# Patient Record
Sex: MALE | Race: WHITE | NOT HISPANIC OR LATINO | Employment: FULL TIME | URBAN - METROPOLITAN AREA
[De-identification: names, ages, dates, MRNs, and addresses within clinical notes are randomized per-mention and may not be internally consistent; named-entity substitution may affect disease eponyms.]

---

## 2023-04-21 ENCOUNTER — OFFICE VISIT (OUTPATIENT)
Dept: PODIATRY | Facility: CLINIC | Age: 28
End: 2023-04-21

## 2023-04-21 VITALS
HEART RATE: 79 BPM | DIASTOLIC BLOOD PRESSURE: 74 MMHG | WEIGHT: 164 LBS | SYSTOLIC BLOOD PRESSURE: 113 MMHG | BODY MASS INDEX: 21.74 KG/M2 | HEIGHT: 73 IN

## 2023-04-21 DIAGNOSIS — M79.675 PAIN IN LEFT TOE(S): ICD-10-CM

## 2023-04-21 DIAGNOSIS — L60.0 INGROWN TOENAIL: Primary | ICD-10-CM

## 2023-04-21 DIAGNOSIS — M79.674 PAIN IN RIGHT TOE(S): ICD-10-CM

## 2023-04-21 RX ORDER — DEXTROAMPHETAMINE SACCHARATE, AMPHETAMINE ASPARTATE, DEXTROAMPHETAMINE SULFATE AND AMPHETAMINE SULFATE 2.5; 2.5; 2.5; 2.5 MG/1; MG/1; MG/1; MG/1
1 TABLET ORAL 2 TIMES DAILY
COMMUNITY
Start: 2023-02-28

## 2023-04-21 NOTE — LETTER
May 9, 2023     Paulette Delvalle, DO  [address] [facility] [address]    Patient: Rodrick Mosley   YOB: 1995   Date of Visit: 4/21/2023       Dear Dr Eric Maradiaga: Thank you for referring Rodrick Mosley to me for evaluation  Below are my notes for this consultation  If you have questions, please do not hesitate to call me  I look forward to following your patient along with you  Sincerely,        Lux Mendez DPM        CC: No Recipients  IKER Cardoza [facility]  5/9/2023 12:03 AM  Signed  Assessment/Plan:    Ingrown toenail  Pain in right toe(s)  Pain in left toe(s)  · Treatment options discussed consisting of partial nail avulsion versus total matrixectomy if issue becomes chronic enough  · Patient instructed on appropriate nail cutting technique  · Follow-up as needed  Other orders  -     amphetamine-dextroamphetamine (ADDERALL) 10 mg tablet; Take 1 tablet by mouth 2 (two) times a day       Subjective:     Patient ID: Rodrick Mosley is a 28 y o  male  4/21/2023: 54-year-old male concerned with possible ingrowing great toenail margins  Has noted some redness and tenderness over several months and has had difficulty cutting them out  The following portions of the patient's history were reviewed and updated as appropriate: allergies, current medications, past family history, past medical history, past social history, past surgical history and problem list     Review of Systems   Constitutional: Negative for chills and fever  HENT: Negative for ear pain and sore throat  Eyes: Negative for pain and visual disturbance  Respiratory: Negative for cough and shortness of breath  Cardiovascular: Negative for chest pain and palpitations  Gastrointestinal: Negative for abdominal pain and vomiting  Genitourinary: Negative for dysuria and hematuria  Musculoskeletal: Negative for arthralgias and back pain  Skin: Negative for color change and rash          Bilateral great "toenail pain from nail margin   Neurological: Negative for seizures and syncope  All other systems reviewed and are negative  Objective:      /74   Pulse 79   Ht 6' 1\" (1 854 m) Comment: verbal  Wt 74 4 kg (164 lb)   BMI 21 64 kg/m²         Physical Exam  Constitutional:       Appearance: Normal appearance  HENT:      Head: Normocephalic  Right Ear: Tympanic membrane normal       Left Ear: Tympanic membrane normal       Nose: No congestion  Mouth/Throat:      Mouth: Mucous membranes are moist       Pharynx: No oropharyngeal exudate or posterior oropharyngeal erythema  Eyes:      Extraocular Movements: Extraocular movements intact  Conjunctiva/sclera: Conjunctivae normal       Pupils: Pupils are equal, round, and reactive to light  Cardiovascular:      Rate and Rhythm: Normal rate and regular rhythm  Pulses: Normal pulses  Pulmonary:      Effort: Pulmonary effort is normal    Abdominal:      Palpations: Abdomen is soft  Musculoskeletal:         General: Normal range of motion  Feet:      Right foot:      Skin integrity: Skin integrity normal       Toenail Condition: Right toenails are ingrown  Left foot:      Skin integrity: Skin integrity normal       Toenail Condition: Left toenails are ingrown  Skin:     General: Skin is warm and dry  Capillary Refill: Capillary refill takes 2 to 3 seconds  Coloration: Skin is not pale  Findings: No bruising, erythema, lesion or rash  Comments: Incurvated left lateral and right medial hallux nail margins, other nail margin somewhat incurvated  No infection or drainage noted  Localized induration noted around periungual tissue  Neurological:      General: No focal deficit present  Mental Status: He is alert  Cranial Nerves: No cranial nerve deficit  Sensory: No sensory deficit  Motor: No weakness        Gait: Gait normal       Deep Tendon Reflexes: Reflexes normal    Psychiatric:    " Mood and Affect: Mood normal          Behavior: Behavior normal          Judgment: Judgment normal

## 2023-05-09 NOTE — PROGRESS NOTES
"Assessment/Plan:    Ingrown toenail  Pain in right toe(s)  Pain in left toe(s)  Treatment options discussed consisting of partial nail avulsion versus total matrixectomy if issue becomes chronic enough  Patient instructed on appropriate nail cutting technique  Follow-up as needed  Other orders  -     amphetamine-dextroamphetamine (ADDERALL) 10 mg tablet; Take 1 tablet by mouth 2 (two) times a day        Subjective:      Patient ID: Bebo Ndiaye is a 28 y o  male  4/21/2023: 25-year-old male concerned with possible ingrowing great toenail margins  Has noted some redness and tenderness over several months and has had difficulty cutting them out  The following portions of the patient's history were reviewed and updated as appropriate: allergies, current medications, past family history, past medical history, past social history, past surgical history and problem list     Review of Systems   Constitutional: Negative for chills and fever  HENT: Negative for ear pain and sore throat  Eyes: Negative for pain and visual disturbance  Respiratory: Negative for cough and shortness of breath  Cardiovascular: Negative for chest pain and palpitations  Gastrointestinal: Negative for abdominal pain and vomiting  Genitourinary: Negative for dysuria and hematuria  Musculoskeletal: Negative for arthralgias and back pain  Skin: Negative for color change and rash  Bilateral great toenail pain from nail margin   Neurological: Negative for seizures and syncope  All other systems reviewed and are negative  Objective:      /74   Pulse 79   Ht 6' 1\" (1 854 m) Comment: verbal  Wt 74 4 kg (164 lb)   BMI 21 64 kg/m²          Physical Exam  Constitutional:       Appearance: Normal appearance  HENT:      Head: Normocephalic  Right Ear: Tympanic membrane normal       Left Ear: Tympanic membrane normal       Nose: No congestion        Mouth/Throat:      Mouth: Mucous membranes are " moist       Pharynx: No oropharyngeal exudate or posterior oropharyngeal erythema  Eyes:      Extraocular Movements: Extraocular movements intact  Conjunctiva/sclera: Conjunctivae normal       Pupils: Pupils are equal, round, and reactive to light  Cardiovascular:      Rate and Rhythm: Normal rate and regular rhythm  Pulses: Normal pulses  Pulmonary:      Effort: Pulmonary effort is normal    Abdominal:      Palpations: Abdomen is soft  Musculoskeletal:         General: Normal range of motion  Feet:      Right foot:      Skin integrity: Skin integrity normal       Toenail Condition: Right toenails are ingrown  Left foot:      Skin integrity: Skin integrity normal       Toenail Condition: Left toenails are ingrown  Skin:     General: Skin is warm and dry  Capillary Refill: Capillary refill takes 2 to 3 seconds  Coloration: Skin is not pale  Findings: No bruising, erythema, lesion or rash  Comments: Incurvated left lateral and right medial hallux nail margins, other nail margin somewhat incurvated  No infection or drainage noted  Localized induration noted around periungual tissue  Neurological:      General: No focal deficit present  Mental Status: He is alert  Cranial Nerves: No cranial nerve deficit  Sensory: No sensory deficit  Motor: No weakness        Gait: Gait normal       Deep Tendon Reflexes: Reflexes normal    Psychiatric:         Mood and Affect: Mood normal          Behavior: Behavior normal          Judgment: Judgment normal

## 2023-06-06 ENCOUNTER — APPOINTMENT (OUTPATIENT)
Dept: RADIOLOGY | Facility: CLINIC | Age: 28
End: 2023-06-06
Payer: COMMERCIAL

## 2023-06-06 ENCOUNTER — OFFICE VISIT (OUTPATIENT)
Dept: OBGYN CLINIC | Facility: CLINIC | Age: 28
End: 2023-06-06
Payer: COMMERCIAL

## 2023-06-06 VITALS
BODY MASS INDEX: 22 KG/M2 | DIASTOLIC BLOOD PRESSURE: 77 MMHG | SYSTOLIC BLOOD PRESSURE: 148 MMHG | HEIGHT: 73 IN | WEIGHT: 166 LBS | HEART RATE: 73 BPM

## 2023-06-06 DIAGNOSIS — M25.562 LEFT KNEE PAIN, UNSPECIFIED CHRONICITY: ICD-10-CM

## 2023-06-06 DIAGNOSIS — M25.562 LEFT KNEE PAIN, UNSPECIFIED CHRONICITY: Primary | ICD-10-CM

## 2023-06-06 PROCEDURE — 99204 OFFICE O/P NEW MOD 45 MIN: CPT | Performed by: STUDENT IN AN ORGANIZED HEALTH CARE EDUCATION/TRAINING PROGRAM

## 2023-06-06 PROCEDURE — 73564 X-RAY EXAM KNEE 4 OR MORE: CPT

## 2023-06-06 RX ORDER — MECLIZINE HYDROCHLORIDE 25 MG/1
25 TABLET ORAL 3 TIMES DAILY
COMMUNITY
Start: 2023-03-02

## 2023-06-06 RX ORDER — METHYLPREDNISOLONE 4 MG/1
TABLET ORAL
COMMUNITY
Start: 2023-03-08

## 2023-06-06 RX ORDER — NAPROXEN 500 MG/1
TABLET ORAL
COMMUNITY
Start: 2023-03-08

## 2023-06-06 RX ORDER — ONDANSETRON 4 MG/1
TABLET, ORALLY DISINTEGRATING ORAL
COMMUNITY
Start: 2023-04-05

## 2023-06-06 NOTE — PROGRESS NOTES
Ortho Sports Medicine Knee New Patient Visit     Assesment:   28 y o  male left knee pain concerning for medial meniscus tear versus symptomatic medial plica    Plan:  The patient's diagnosis and treatment were discussed at length today  We discussed no treatment, non-operative treatment, and operative treatment  The patient's finding and exam are consistent with left knee pain concerning for medial meniscus tear versus symptomatic medial plica  Given he has attempted 6-8+ weeks of nonoperative treatment with continued symptoms as well as mechanical symptoms, I did recommend obtaining an MRI to rule out any further internal derangement  MRI of left knee was ordered  I did recommend that he continues with his home exercise program and continues with his current activity restrictions  He can continue to use ice, heat, and over-the-counter medication as needed for pain relief  He is to follow-up in approximately 1 to 2 weeks for reevaluation and discussion of MRI results  Conservative treatment:    Ice to knee for 20 minutes at least 1-2 times daily  PT for ROM/strengthening to knee, hip and core  OTC NSAIDS prn for pain  Let pain guide gradual return activities  Imaging: All imaging from today was reviewed by myself and explained to the patient  We will obtain an MRI of the knee to rule out internal derangement  Follow up in 1-2 weeks for MRI review  Will make a definitive treatment plan based on the results of the MRI  Injection:    No Injection planned at this time  Surgery:     No surgery is recommended at this point, continue with conservative treatment plan as noted  Follow up:    Return for results following MRI  Chief Complaint   Patient presents with   • Left Knee - Pain       History of Present Illness:     The patient is a 28 y o  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, referred to me by their primary care physician, seen in clinic for evaluation of left knee pain   He states he has been having ongoing left knee pain for several months now after he was standing at work and felt a pop in his leg followed by significant pain and swelling  He states that his pain is predominantly along the anterior medial aspect of his knee and rates it a 5 out of 10 at rest and increases to a 7 out of 10 with activity  He states that he has been in outpatient physical therapy for 6-8+ weeks now without any significant relief of his symptoms  He denies any instability, however he does report mechanical catching and locking sensation where he feels like his leg is stuck and will not fully extend  He states that he has tried a knee brace that does not provide him any comfort and stability  He is currently attempting to manage his pain with rest, ice, and over-the-counter medication  Knee Surgical History:  None    Past Medical, Social and Family History:  History reviewed  No pertinent past medical history  History reviewed  No pertinent surgical history  No Known Allergies  Current Outpatient Medications on File Prior to Visit   Medication Sig Dispense Refill   • amphetamine-dextroamphetamine (ADDERALL) 10 mg tablet Take 1 tablet by mouth 2 (two) times a day     • meclizine (ANTIVERT) 25 mg tablet Take 25 mg by mouth 3 (three) times a day     • methylPREDNISolone 4 MG tablet therapy pack TAKE 6 TABLETS ON DAY 1 AS DIRECTED ON PACKAGE AND DECREASE BY 1 TAB EACH DAY FOR A TOTAL OF 6 DAYS     • naproxen (NAPROSYN) 500 mg tablet TAKE 1 TABLET BY MOUTH TWICE A DAY AS NEEDED FOR PAIN FOR 5 DAYS     • ondansetron (ZOFRAN-ODT) 4 mg disintegrating tablet        No current facility-administered medications on file prior to visit       Social History     Socioeconomic History   • Marital status: Single     Spouse name: Not on file   • Number of children: Not on file   • Years of education: Not on file   • Highest education level: Not on file   Occupational History   • Not on file   Tobacco Use "  • Smoking status: Never     Passive exposure: Never   • Smokeless tobacco: Never   Substance and Sexual Activity   • Alcohol use: Yes   • Drug use: Yes     Types: Marijuana   • Sexual activity: Not on file   Other Topics Concern   • Not on file   Social History Narrative   • Not on file     Social Determinants of Health     Financial Resource Strain: Not on file   Food Insecurity: Not on file   Transportation Needs: Not on file   Physical Activity: Not on file   Stress: Not on file   Social Connections: Not on file   Intimate Partner Violence: Not on file   Housing Stability: Not on file         I have reviewed the past medical, surgical, social and family history, medications and allergies as documented in the EMR  Review of systems: ROS is negative other than that noted in the HPI  Constitutional: Negative for fatigue and fever  HENT: Negative for sore throat  Respiratory: Negative for shortness of breath  Cardiovascular: Negative for chest pain  Gastrointestinal: Negative for abdominal pain  Endocrine: Negative for cold intolerance and heat intolerance  Genitourinary: Negative for flank pain  Musculoskeletal: Negative for back pain  Skin: Negative for rash  Allergic/Immunologic: Negative for immunocompromised state  Neurological: Negative for dizziness  Psychiatric/Behavioral: Negative for agitation  Physical Exam:    Blood pressure 148/77, pulse 73, height 6' 1\" (1 854 m), weight 75 3 kg (166 lb)      General/Constitutional: NAD, well developed, well nourished  HENT: Normocephalic, atraumatic  CV: Intact distal pulses, regular rate  Resp: No respiratory distress or labored breathing  Lymphatic: No lymphadenopathy palpated  Neuro: Alert and Oriented x 3, no focal deficits  Psych: Normal mood, normal affect, normal judgement, normal behavior  Skin: Warm, dry, no rashes, no erythema      Knee Exam (focused):  Visual inspection of the Left knee demonstrates normal contour without " atrophy  No previous incisions   There is no significant erythema or edema  No significant joint effusion   Range of motion is full from 0-130 degrees of flexion   Able to straight leg raise   No patella tender to palpation  Mild medial joint line tenderness, No lateral joint line tenderness  + medial Cari's, - lateral Cari's  1A Lachman exam, Stable posterior drawer  - dial test  Stable to varus and valgus stress at both 0 and 30°  Patella tracks normally  No J sign  No apprehension  Translation is approximately 2 quadrants and is equal to the contralateral side  Patellar eversion is similar to the contralateral side    Examination of the patient's ipsilateral hip demonstrates full painless range of motion  No crepitus  LE NV Exam: +2 DP/PT pulses bilaterally  Sensation intact to light touch L2-S1 bilaterally     Bilateral hip ROM demonstrates no pain actively or passively    No calf tenderness to palpation bilaterally    Knee Imaging    X-rays of the left knee were reviewed, which demonstrate no acute osseous abnormalities or evidence of dislocation  I have reviewed the radiology report and do not currently have a radiology reading from  [facility], but will check the result once the reading is performed        Scribe Attestation    I,:  Keya Fairchild am acting as a scribe while in the presence of the attending physician :       I,:  Jony Esparza, DO personally performed the services described in this documentation    as scribed in my presence :

## 2023-06-22 ENCOUNTER — HOSPITAL ENCOUNTER (OUTPATIENT)
Dept: MRI IMAGING | Facility: HOSPITAL | Age: 28
End: 2023-06-22
Attending: STUDENT IN AN ORGANIZED HEALTH CARE EDUCATION/TRAINING PROGRAM
Payer: COMMERCIAL

## 2023-06-22 DIAGNOSIS — M25.562 LEFT KNEE PAIN, UNSPECIFIED CHRONICITY: ICD-10-CM

## 2023-06-22 PROCEDURE — G1004 CDSM NDSC: HCPCS

## 2023-06-22 PROCEDURE — 73721 MRI JNT OF LWR EXTRE W/O DYE: CPT

## 2023-06-29 ENCOUNTER — OFFICE VISIT (OUTPATIENT)
Dept: OBGYN CLINIC | Facility: CLINIC | Age: 28
End: 2023-06-29
Payer: COMMERCIAL

## 2023-06-29 VITALS
HEIGHT: 73 IN | BODY MASS INDEX: 22 KG/M2 | WEIGHT: 166 LBS | SYSTOLIC BLOOD PRESSURE: 132 MMHG | HEART RATE: 74 BPM | DIASTOLIC BLOOD PRESSURE: 83 MMHG | RESPIRATION RATE: 18 BRPM

## 2023-06-29 DIAGNOSIS — M22.2X1 RIGHT PATELLOFEMORAL SYNDROME: Primary | ICD-10-CM

## 2023-06-29 DIAGNOSIS — M22.2X2 PATELLOFEMORAL SYNDROME, LEFT: ICD-10-CM

## 2023-06-29 DIAGNOSIS — M76.31 IT BAND SYNDROME, RIGHT: ICD-10-CM

## 2023-06-29 PROCEDURE — 99214 OFFICE O/P EST MOD 30 MIN: CPT | Performed by: STUDENT IN AN ORGANIZED HEALTH CARE EDUCATION/TRAINING PROGRAM

## 2023-06-29 NOTE — PROGRESS NOTES
Ortho Sports Medicine Knee Follow Up Visit     Assesment:     28 y o  male bilateral knee patellofemoral syndrome    Plan:  The patient's diagnosis and treatment were discussed at length today  We discussed no treatment, non-operative treatment, and operative treatment  Patient's finding and exam are consistent with bilateral knee patellofemoral pain syndrome  I discussed with him that his left knee MRI demonstrates no evidence of internal derangement with no injury to the cruciate ligaments or medial/lateral meniscus  MRI did demonstrate very minimal medial plica that may be symptomatic, however this can be managed with activity modification and physical therapy  I did provide him with a referral to outpatient physical therapy for bilateral patellofemoral pain syndrome as he is also demonstrating similar symptoms on his right side  I discussed with him that he is able to continue perform activity as tolerated, using pain as a guide  He continue to use his brace for either left or right knee for comfort and stability  He is to follow-up on an as-needed basis  Conservative treatment:    Ice to knee for 20 minutes at least 1-2 times daily  PT for ROM/strengthening to knee, hip and core  OTC NSAIDS prn for pain  Let pain guide gradual return activities  Imaging: All imaging from today was reviewed by myself and explained to the patient  Injection:    No Injection planned at this time  Surgery:     No surgery is recommended at this point, continue with conservative treatment plan as noted  Follow up:    No follow-ups on file  Chief Complaint   Patient presents with   • Left Knee - Follow-up     MRI Review       History of Present Illness: The patient is returns for follow up of left knee pain  Since the prior visit, He he states that he is overall doing well at this time and only reports mild pain and discomfort along the anterior aspect of his left knee    He states that his pain is very dull and achy and rates it a 2 out of 10 after periods of prolonged standing and walking  He states that he has been compliant with outpatient physical therapy as well as use of his brace which does increase the comfort and stability  However, he does mention that recently he has been having similar symptoms on his right knee  He denies any mechanical symptoms of catching or locking  He denies any instability  He denies any swelling  He denies any injury or trauma  He overall does feel like he is progressing well with physical therapy and is able to return to activities of daily living without any restrictions or limitations  Knee Surgical History:  None    Past Medical, Social and Family History:  History reviewed  No pertinent past medical history  History reviewed  No pertinent surgical history  No Known Allergies  Current Outpatient Medications on File Prior to Visit   Medication Sig Dispense Refill   • amphetamine-dextroamphetamine (ADDERALL) 10 mg tablet Take 1 tablet by mouth 2 (two) times a day     • meclizine (ANTIVERT) 25 mg tablet Take 25 mg by mouth 3 (three) times a day     • methylPREDNISolone 4 MG tablet therapy pack TAKE 6 TABLETS ON DAY 1 AS DIRECTED ON PACKAGE AND DECREASE BY 1 TAB EACH DAY FOR A TOTAL OF 6 DAYS     • naproxen (NAPROSYN) 500 mg tablet TAKE 1 TABLET BY MOUTH TWICE A DAY AS NEEDED FOR PAIN FOR 5 DAYS     • ondansetron (ZOFRAN-ODT) 4 mg disintegrating tablet        No current facility-administered medications on file prior to visit  Social History     Socioeconomic History   • Marital status: Single     Spouse name: Not on file   • Number of children: Not on file   • Years of education: Not on file   • Highest education level: Not on file   Occupational History   • Not on file   Tobacco Use   • Smoking status: Never     Passive exposure: Never   • Smokeless tobacco: Never   Substance and Sexual Activity   • Alcohol use:  Yes   • Drug use: Yes     Types: "Marijuana   • Sexual activity: Not on file   Other Topics Concern   • Not on file   Social History Narrative   • Not on file     Social Determinants of Health     Financial Resource Strain: Not on file   Food Insecurity: Not on file   Transportation Needs: Not on file   Physical Activity: Not on file   Stress: Not on file   Social Connections: Not on file   Intimate Partner Violence: Not on file   Housing Stability: Not on file         I have reviewed the past medical, surgical, social and family history, medications and allergies as documented in the EMR  Review of systems: ROS is negative other than that noted in the HPI  Constitutional: Negative for fatigue and fever  Physical Exam:    Blood pressure 132/83, pulse 74, resp  rate 18, height 6' 1\" (1 854 m), weight 75 3 kg (166 lb)  General/Constitutional: NAD, well developed, well nourished  HENT: Normocephalic, atraumatic  CV: Intact distal pulses, regular rate  Resp: No respiratory distress or labored breathing  Lymphatic: No lymphadenopathy palpated  Neuro: Alert and Oriented x 3, no focal deficits  Psych: Normal mood, normal affect, normal judgement, normal behavior  Skin: Warm, dry, no rashes, no erythema      Knee Exam (focused):  Visual inspection of bilateral knees demonstrates normal contour without atrophy  No previous incisions   There is no significant erythema or edema  No significant joint effusion   Range of motion is full from 0-130 degrees of flexion   Able to straight leg raise   Mild patella tendon and quadriceps tendon tender to palpation without crepitus  No medial joint line tenderness, no lateral joint line tenderness  - medial Cari's, - lateral Cari's  1A Lachman exam, stable posterior drawer  - dial test  Stable to varus and valgus stress at both 0 and 30°  Patella tracks normally  No J sign  No apprehension    Translation is approximately 2 quadrants and is equal to the contralateral side  Patellar eversion is " similar to the contralateral side    Examination of the patient's ipsilateral hip demonstrates full painless range of motion  No crepitus  LE NV Exam: +2 DP/PT pulses bilaterally  Sensation intact to light touch L2-S1 bilaterally    No calf tenderness to palpation bilaterally      Knee Imaging    MRI of left knee demonstrates no evidence of internal derangement  There is a tiny medial plica without associated chondral abnormalities noted    I have reviewed and agree that with the radiologist interpretation      Scribe Attestation    I,:  Pat Aranda am acting as a scribe while in the presence of the attending physician :       I,:  Latoya Pryor DO personally performed the services described in this documentation    as scribed in my presence :

## 2023-07-25 ENCOUNTER — EVALUATION (OUTPATIENT)
Dept: PHYSICAL THERAPY | Facility: CLINIC | Age: 28
End: 2023-07-25
Payer: COMMERCIAL

## 2023-07-25 DIAGNOSIS — M76.31 IT BAND SYNDROME, RIGHT: ICD-10-CM

## 2023-07-25 DIAGNOSIS — M22.2X2 PATELLOFEMORAL SYNDROME, LEFT: ICD-10-CM

## 2023-07-25 DIAGNOSIS — M22.2X1 RIGHT PATELLOFEMORAL SYNDROME: Primary | ICD-10-CM

## 2023-07-25 PROCEDURE — 97161 PT EVAL LOW COMPLEX 20 MIN: CPT | Performed by: PHYSICAL THERAPIST

## 2023-07-25 PROCEDURE — 97110 THERAPEUTIC EXERCISES: CPT | Performed by: PHYSICAL THERAPIST

## 2023-07-25 NOTE — PROGRESS NOTES
PT Evaluation     Today's date: 2023  Patient name: Lien Cha  : 1995  MRN: [id number]  Referring provider: Shanice Guerrero DO  Dx:   Encounter Diagnosis     ICD-10-CM    1. Right patellofemoral syndrome  M22.2X1 Ambulatory Referral to Physical Therapy      2. Patellofemoral syndrome, left  M22.2X2 Ambulatory Referral to Physical Therapy      3. It band syndrome, right  M76.31 Ambulatory Referral to Physical Therapy                     Assessment  Assessment details: Lien Cha is a 28 y.o. male presenting to outpatient physical therapy at [facility] with complaints of bilateral knee pain.  They present with decreased range of motion, decreased strength, limited flexibility, poor postural awareness, poor body mechanics, poor balance, decreased tolerance to activity and decreased functional mobility due to Right patellofemoral syndrome  (primary encounter diagnosis)  Patellofemoral syndrome, left  It band syndrome, right. Katy Murphy would benefit from skilled physical therapy to address noted impairments in order to allow for full functional return to work and ADL-related activities. Thank you for the referral!  Impairments: abnormal muscle firing, abnormal or restricted ROM, activity intolerance, impaired balance, impaired physical strength, lacks appropriate home exercise program, pain with function and poor body mechanics  Barriers to therapy: n/a  Understanding of Dx/Px/POC: excellent  Goals  ST.  Independent with HEP in 2 weeks  2. Decrease pain by 50% in 3 wks      LT. Achieve FOTO score of 74/100 in 6 weeks   2.  Able to perform stairs without pain/instability in 6 weeks  3. Strength = 5/5 L quad ext in 6 weeks      Plan  Plan details: RE in 6 weeks.   Patient would benefit from: skilled physical therapy  Planned modality interventions: cryotherapy, electrical stimulation/Russian stimulation and thermotherapy: hydrocollator packs  Planned therapy interventions: abdominal trunk stabilization, manual therapy, neuromuscular re-education, therapeutic activities, therapeutic exercise, body mechanics training and home exercise program  Frequency: 2x week  Duration in weeks: 6  Plan of Care beginning date: 7/25/2023  Plan of Care expiration date: 9/8/2023  Treatment plan discussed with: patient        Subjective Evaluation    History of Present Illness  Mechanism of injury: Pt c/o bilateral knee pain. L knee pain Onset October 2022. Pt was standing, talking to his coworkers for some time. When he went to walk away, he felt a pop in the knee with swelling to follow. Denies bruising. He did go to PT for 8 weeks a few months ago, which consisted of strengthening the quads. He reports no significant change in symptoms with this. He did develop R knee pain as a result of compensations. Imaging negative. Pain is located anterior medial L knee. Described as intermittent dull aching. Rated 0-4/10 pain. Agg with pushing his push mower up an incline, being in a crouched/squatted position for prolonged periods of time. Eased with ibuprofen. Positive for knee giving out, clicking. Positive for unsteadiness with gait and stairs. 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   Patient Goals  Patient goals for therapy: decreased edema, decreased pain, improved balance, increased motion, return to work, return to [employer] activities, independence with ADLs/IADLs and increased strength          Objective     Observations     Additional Observation Details  No edema noted    Tenderness     Additional Tenderness Details  TTP medial and anterior L knee joint line; patellar tendon    (-) ant, post drawer  (-) varus, valgus stress  (-) Medial, lat mcmurrays    SLR to 90+ deg with mild ext lag secondary to HS restriction    Active Range of Motion   Left Hip   Internal rotation (90/90): 5 degrees     Right Hip   Internal rotation (90/90): 0 degrees   Left Knee Normal active range of motion    Right Knee   Normal active range of motion    Strength/Myotome Testing     Left Knee   Flexion: 4+  Extension: 4+    Right Knee   Flexion: 5  Extension: 5    Functional Assessment        Comments  Gait: WFL  SLS: fair, 10 sec bilat  SL HR: fair bilat  Squat: poor form - knee dominant with excessive tibial anterior translation with heel lift bilaterally, pain             Precautions: bilat patellofemoral synd      HEP:  Access Code: [email or web address]  URL: [email or web address]  Date: 07/25/2023  Prepared by: Rafael Ruelas    Exercises  - Supine Piriformis Stretch with Leg Straight  - 2 sets - 30 sec hold  - Supine Hamstring Stretch  - 30 reps  - Prone Quad Stretch with Towel Roll and Strap  - 2 sets - 30 sec hold      Manuals 7/25            Quad/hip flexor stretching             Hip IR stretching             Quad roller NYA            Posterior chain roller NYA                                      Neuro Re-Ed             SLS             Foam walk             bosu taps             bosu balance                                                    Ther Ex             TM fwd walk             TM bwd walk             Supine piri stretch 30"x2 ea            90/90 active HS stretch x30 ea            Prone quad stretch w strap 30"x2            Hip flexor stretch             Sled push/pull             CC walk outs lat             CC walk outs fwd             CC walk outs bwd             TKE                                                                 Ther Activity                                       Gait Training                                       Modalities

## 2023-07-31 ENCOUNTER — APPOINTMENT (OUTPATIENT)
Dept: PHYSICAL THERAPY | Facility: CLINIC | Age: 28
End: 2023-07-31
Payer: COMMERCIAL

## 2023-08-07 ENCOUNTER — OFFICE VISIT (OUTPATIENT)
Dept: PHYSICAL THERAPY | Facility: CLINIC | Age: 28
End: 2023-08-07
Payer: COMMERCIAL

## 2023-08-07 DIAGNOSIS — M22.2X1 RIGHT PATELLOFEMORAL SYNDROME: Primary | ICD-10-CM

## 2023-08-07 DIAGNOSIS — M22.2X2 PATELLOFEMORAL SYNDROME, LEFT: ICD-10-CM

## 2023-08-07 PROCEDURE — 97110 THERAPEUTIC EXERCISES: CPT

## 2023-08-07 PROCEDURE — 97140 MANUAL THERAPY 1/> REGIONS: CPT

## 2023-08-07 PROCEDURE — 97112 NEUROMUSCULAR REEDUCATION: CPT

## 2023-08-07 NOTE — PROGRESS NOTES
Daily Note     Today's date: 2023  Patient name: Jose Alexandra  : 1995  MRN: [id number]  Referring provider: Melvin Chicas DO  Dx:   Encounter Diagnosis     ICD-10-CM    1. Right patellofemoral syndrome  M22.2X1       2. Patellofemoral syndrome, left  M22.2X2                      Subjective: Pt reports he feels a little less tightness since starting his HEP. Objective: See treatment diary below      Assessment: Initiated PT POC. Pt has good ROM but benefits from the soft tissue work to LLE muscles. Has only fair balance on uneven surfaces Tolerated treatment well. Patient demonstrated fatigue post treatment, exhibited good technique with therapeutic exercises and would benefit from continued PT      Plan: Continue per plan of care. Progress note during next visit. Progress treatment as tolerated. Precautions: bilat patellofemoral synd      HEP:  Access Code: [email or web address]  URL: [email or web address]  Date: 2023  Prepared by: Josue Gamez    Exercises  - Supine Piriformis Stretch with Leg Straight  - 2 sets - 30 sec hold  - Supine Hamstring Stretch  - 30 reps  - Prone Quad Stretch with Towel Roll and Strap  - 2 sets - 30 sec hold      Manuals  8/           Quad/hip flexor stretching  WE           Hip IR stretching  WE           Quad roller NYA WE           Posterior chain roller NYA WE                                     Neuro Re-Ed             SLS  Foam  20"x3           Foam walk  Beam  3 laps           bosu taps  x20 ea           bosu balance  30"x3                                                  Ther Ex             TM fwd walk  3 min  Incline  15%           TM bwd walk  Self propel 3 min           Supine piri stretch 30"x2 ea            90/90 active HS stretch x30 ea manual           Prone quad stretch w strap 30"x2 manual           Hip flexor stretch  manual           Sled push/pull             CC walk outs lat  55#  x5 ea           CC walk outs fwd  55#  x5 CC walk outs bwd  55#  x5           TKE  BTB  x30           Leg Press  205#  x20           SL Leg Press  105#  x10                                     Ther Activity                                       Gait Training                                       Modalities

## 2023-08-14 ENCOUNTER — OFFICE VISIT (OUTPATIENT)
Dept: PHYSICAL THERAPY | Facility: CLINIC | Age: 28
End: 2023-08-14
Payer: COMMERCIAL

## 2023-08-14 DIAGNOSIS — M22.2X1 RIGHT PATELLOFEMORAL SYNDROME: Primary | ICD-10-CM

## 2023-08-14 DIAGNOSIS — M22.2X2 PATELLOFEMORAL SYNDROME, LEFT: ICD-10-CM

## 2023-08-14 PROCEDURE — 97140 MANUAL THERAPY 1/> REGIONS: CPT

## 2023-08-14 PROCEDURE — 97112 NEUROMUSCULAR REEDUCATION: CPT

## 2023-08-14 PROCEDURE — 97110 THERAPEUTIC EXERCISES: CPT

## 2023-08-14 NOTE — PROGRESS NOTES
Daily Note     Today's date: 2023  Patient name: Ema Martins  : 1995  MRN: [id number]  Referring provider: Taylor Gaming DO  Dx:   Encounter Diagnosis     ICD-10-CM    1. Right patellofemoral syndrome  M22.2X1       2. Patellofemoral syndrome, left  M22.2X2                      Subjective: Pt reports he felt increased tightness after LV. Overall feeling a little better since starting therapy     Objective: See treatment diary below      Assessment: Trialed roller to ITB followed b/l LE stretching and IASTM to b/l patellar tendons. He felt decreased tightness post. Showed improved stability with progressed balance activities and continued to challenge strengthening TE's with no adverse effects. Tolerated treatment well. Patient demonstrated fatigue post treatment, exhibited good technique with therapeutic exercises and would benefit from continued PT      Plan: Continue per plan of care. Progress note during next visit. Progress treatment as tolerated. Precautions: bilat patellofemoral synd      HEP:  Access Code: [email or web address]  URL: [email or web address]  Date: 2023  Prepared by: Valiant Neas    Exercises  - Supine Piriformis Stretch with Leg Straight  - 2 sets - 30 sec hold  - Supine Hamstring Stretch  - 30 reps  - Prone Quad Stretch with Towel Roll and Strap  - 2 sets - 30 sec hold      Manuals           Quad/hip flexor stretching  WE WE          Hip IR stretching  WE WE          Quad roller NYA WE WE          Posterior chain roller NYA WE WE          IASTM b/l patellar tendons                          Neuro Re-Ed             SLS  Foam  20"x3 Bosu  Round up  20"x3 ea          Foam walk  Beam  3 laps           bosu taps  x20 ea x20 ea          bosu balance  30"x3 30"x3          SL star balance   x10 ea          Bosu squats   5"x10                       Ther Ex             TM fwd walk  3 min  Incline  15% 3 min  Incline  15%          TM bwd walk  Self propel 3 min Self propel 3 min          Supine piri stretch 30"x2 ea            90/90 active HS stretch x30 ea manual           Prone quad stretch w strap 30"x2 manual           Hip flexor stretch  manual           Sled push/pull             CC walk outs lat  55#  x5 ea           CC walk outs fwd  55#  x5           CC walk outs bwd  55#  x5           TKE  BTB  x30 CC  x20          Leg Press  205#  x20           SL Leg Press  105#  x10 115#  x20 ea                                    Ther Activity                                       Gait Training                                       Modalities

## 2023-08-21 ENCOUNTER — OFFICE VISIT (OUTPATIENT)
Dept: PHYSICAL THERAPY | Facility: CLINIC | Age: 28
End: 2023-08-21
Payer: COMMERCIAL

## 2023-08-21 DIAGNOSIS — M22.2X2 PATELLOFEMORAL SYNDROME, LEFT: ICD-10-CM

## 2023-08-21 DIAGNOSIS — M22.2X1 RIGHT PATELLOFEMORAL SYNDROME: Primary | ICD-10-CM

## 2023-08-21 DIAGNOSIS — M76.31 IT BAND SYNDROME, RIGHT: ICD-10-CM

## 2023-08-21 PROCEDURE — 97110 THERAPEUTIC EXERCISES: CPT | Performed by: PHYSICAL THERAPIST

## 2023-08-21 NOTE — PROGRESS NOTES
Daily Note     Today's date: 2023  Patient name: Candida Muñoz  : 1995  MRN: [id number]  Referring provider: Rona Lucero DO  Dx:   Encounter Diagnosis     ICD-10-CM    1. Right patellofemoral syndrome  M22.2X1       2. Patellofemoral syndrome, left  M22.2X2       3. It band syndrome, right  M76.31           Start Time: 1530          Subjective: Feeling better in the knees. Did do a workout with leg press the other week and noticed increased knee pain after. Having quad tightness today. Objective: See treatment diary below      Assessment: Discussed with pt the importance of daily mobility work for his quads and hip flexors, especially on days when he is doing heavy leg work. Favorable to couch stretch for anterior chain mobility. Shaking with glute-biased leg press, likely secondary to glute weakness. Encouraged pt to perform glute strengthening when he goes to the gym. Overall he notes improvement in symptoms end of session. Tolerated treatment well. Patient demonstrated fatigue post treatment, exhibited good technique with therapeutic exercises and would benefit from continued PT      Plan: Continue per plan of care. Progress note during next visit. Progress treatment as tolerated. Precautions: bilat patellofemoral synd      HEP:  Access Code: [email or web address]  URL: [email or web address]  Date: 2023  Prepared by: Marlyn Barron    Exercises  - Supine Piriformis Stretch with Leg Straight  - 2 sets - 30 sec hold  - Supine Hamstring Stretch  - 30 reps  - Prone Quad Stretch with Towel Roll and Strap  - 2 sets - 30 sec hold      Manuals          Quad/hip flexor stretching  WE WE          Hip IR stretching  WE WE          Quad roller NYA WE WE NYA         Posterior chain roller NYA WE WE          IASTM b/l patellar tendons                          Neuro Re-Ed             SLS  Foam  20"x3 Bosu  Round up  20"x3 ea          Foam walk  Beam  3 laps bosu taps  x20 ea x20 ea          bosu balance  30"x3 30"x3          SL star balance   x10 ea          Bosu squats   5"x10                       Ther Ex             Elliptical    6'         TM fwd walk  3 min  Incline  15% 3 min  Incline  15%          TM bwd walk  Self propel 3 min Self propel 3 min          Supine piri stretch 30"x2 ea   30"x2 ea         90/90 active HS stretch x30 ea manual           Prone quad stretch w strap 30"x2 manual  30"x2 ea         Hip flexor stretch  manual           Couch stretch    30"x3 ea         spiderman stretch    x5 ea         squat    x20         CC walk outs lat  55#  x5 ea           CC walk outs fwd  55#  x5           CC walk outs bwd  55#  x5           TKE  BTB  x30 CC  x20          Leg Press  205#  x20  135# 3x10         SL Leg Press  105#  x10 115#  x20 ea                                    Ther Activity                                       Gait Training                                       Modalities

## 2023-08-28 ENCOUNTER — APPOINTMENT (OUTPATIENT)
Dept: PHYSICAL THERAPY | Facility: CLINIC | Age: 28
End: 2023-08-28
Payer: COMMERCIAL

## 2023-09-05 ENCOUNTER — EVALUATION (OUTPATIENT)
Dept: PHYSICAL THERAPY | Facility: CLINIC | Age: 28
End: 2023-09-05
Payer: COMMERCIAL

## 2023-09-05 DIAGNOSIS — M22.2X1 RIGHT PATELLOFEMORAL SYNDROME: Primary | ICD-10-CM

## 2023-09-05 DIAGNOSIS — M76.31 IT BAND SYNDROME, RIGHT: ICD-10-CM

## 2023-09-05 DIAGNOSIS — M22.2X2 PATELLOFEMORAL SYNDROME, LEFT: ICD-10-CM

## 2023-09-05 PROCEDURE — 97140 MANUAL THERAPY 1/> REGIONS: CPT | Performed by: PHYSICAL THERAPIST

## 2023-09-05 PROCEDURE — 97110 THERAPEUTIC EXERCISES: CPT | Performed by: PHYSICAL THERAPIST

## 2023-09-05 NOTE — PROGRESS NOTES
PT Re-Evaluation     Today's date: 2023  Patient name: Wesley Akhtar  : 1995  MRN: [id number]  Referring provider: Meenakshi Eric DO  Dx:   Encounter Diagnosis     ICD-10-CM    1. Right patellofemoral syndrome  M22.2X1       2. Patellofemoral syndrome, left  M22.2X2       3. It band syndrome, right  M76.31           Start Time: 1530          Assessment  Assessment details: Wesley Akhtar is a 28 y.o. male presenting to outpatient physical therapy at [facility] with complaints of bilateral knee pain.  They present with decreased range of motion, decreased strength, limited flexibility, poor postural awareness, poor body mechanics, poor balance, decreased tolerance to activity and decreased functional mobility due to Right patellofemoral syndrome  (primary encounter diagnosis)  Patellofemoral syndrome, left  It band syndrome, right. Luz Marina Blunt would benefit from skilled physical therapy to address noted impairments in order to allow for full functional return to work and ADL-related activities. Thank you for the referral!    RE: 23  Wesley Akhtar has attended physical therapy for 5 visits. In this time, they have made significant improvements in symptoms and function, as noted by subjective report, improved balance, ROM, strength, flexibility and activity tolerance. They have made positive goal progress with FOTO score improvement from 67 at initial evaluation to 79 currently. They do continue to have impairments in pain, ROM, strength, balance, flexibility and activity tolerance. Recommend continued skilled PT in order to maximize function. Impairments: abnormal muscle firing, abnormal or restricted ROM, activity intolerance, impaired balance, impaired physical strength, lacks appropriate home exercise program, pain with function and poor body mechanics  Barriers to therapy: n/a  Understanding of Dx/Px/POC: excellent  Goals  ST.  Independent with HEP in 2 weeks - met  2.  Decrease pain by 50% in 3 wks - not met      LT. Achieve FOTO score of 74/100 in 6 weeks - not met  2.  Able to perform stairs without pain/instability in 6 weeks - not met  3. Strength = 5/5 L quad ext in 6 weeks - not met      Plan  Plan details: RE in 4 weeks. Patient would benefit from: skilled physical therapy  Planned modality interventions: cryotherapy, electrical stimulation/Russian stimulation and thermotherapy: hydrocollator packs  Planned therapy interventions: abdominal trunk stabilization, manual therapy, neuromuscular re-education, therapeutic activities, therapeutic exercise, body mechanics training and home exercise program  Frequency: 2x week  Plan of Care beginning date: 2023  Plan of Care expiration date: 2023  Treatment plan discussed with: patient        Subjective Evaluation    History of Present Illness  Mechanism of injury: Pt c/o bilateral knee pain. L knee pain Onset 2022. Pt was standing, talking to his coworkers for some time. When he went to walk away, he felt a pop in the knee with swelling to follow. Denies bruising. He did go to PT for 8 weeks a few months ago, which consisted of strengthening the quads. He reports no significant change in symptoms with this. He did develop R knee pain as a result of compensations. Imaging negative. Pain is located anterior medial L knee. Described as intermittent dull aching. Rated 0-4/10 pain. Agg with pushing his push mower up an incline, being in a crouched/squatted position for prolonged periods of time. Eased with ibuprofen. Positive for knee giving out, clicking. Positive for unsteadiness with gait and stairs. 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 RE: 23  Pt notes improvement in flexibility of his legs since starting therapy, though the dull aching pain remains a 4/10.  He continues to have pain with pushing a mower up an incline or being in a bent/squatted position for prolonged periods of time. He's been compliant with the HEP. Patient Goals  Patient goals for therapy: decreased edema, decreased pain, improved balance, increased motion, return to work, return to [employer] activities, independence with ADLs/IADLs and increased strength          Objective     Observations     Additional Observation Details  No edema noted    Tenderness     Additional Tenderness Details  TTP medial and anterior L knee joint line; patellar tendon    (-) ant, post drawer  (-) varus, valgus stress  (-) Medial, lat mcmurrays    SLR to 90+ deg with mild ext lag secondary to HS restriction    Active Range of Motion   Left Hip   Internal rotation (90/90): 5 degrees     Right Hip   Internal rotation (90/90): 0 degrees   Left Knee   Normal active range of motion    Right Knee   Normal active range of motion    Strength/Myotome Testing     Left Hip   Planes of Motion   Flexion: 4+  Extension: 4+  Abduction: 4+  Adduction: 5  External rotation: 5  Internal rotation: 5    Right Hip   Planes of Motion   Flexion: 4+  Extension: 4+  Abduction: 4+  Adduction: 5  External rotation: 5  Internal rotation: 5    Left Knee   Flexion: 4+  Extension: 4+    Right Knee   Flexion: 5  Extension: 4+    Left Ankle/Foot   Dorsiflexion: 5    Right Ankle/Foot   Dorsiflexion: 5    Functional Assessment        Comments  Gait: WFL  SLS: fair, 10 sec bilat  SL HR: fair bilat  Squat: poor form - knee dominant with excessive tibial anterior translation with heel lift bilaterally, pain             recautions: bilat patellofemoral synd      HEP:  Access Code: [email or web address]  URL: [email or web address]  Date: 09/05/2023  Prepared by: Sean Hernandez    Exercises  - Seated Knee Extension with Resistance  - 20 reps  - Hip Abduction with Resistance Loop  - 20 reps  - Hip Extension with Resistance Loop  - 20 reps  - Standing Hip Flexion with Resistance Loop  - 20 reps    Manuals 7/25 8/7 8/14 8/21 9/5        [facility] FOTO     NYA        Quad/hip flexor stretching  WE WE          Hip IR stretching  WE WE          Quad roller NYA WE WE NYA         Posterior chain roller NYA WE WE          IASTM b/l patellar tendons                          Neuro Re-Ed             SLS  Foam  20"x3 Bosu  Round up  20"x3 ea          Foam walk  Beam  3 laps           bosu taps  x20 ea x20 ea          bosu balance  30"x3 30"x3          SL star balance   x10 ea          Bosu squats   5"x10                       Ther Ex             Elliptical    6'         TM fwd walk  3 min  Incline  15% 3 min  Incline  15%  5'        TM bwd walk  Self propel 3 min Self propel 3 min          Supine piri stretch 30"x2 ea   30"x2 ea         90/90 active HS stretch x30 ea manual   x20 ea        Prone quad stretch w strap 30"x2 manual  30"x2 ea         Hip flexor stretch  manual           Couch stretch    30"x3 ea 30"x2 ea        spiderman stretch    x5 ea x5 ea        SAQ     Blue bolst 10# 5"x20 ea        LAQ     10# x20 ea        squat    x20 x20        3way hip     otb loop x20 ea        Fwd, lat lunge     nv        CC walk outs lat  55#  x5 ea           CC walk outs fwd  55#  x5           CC walk outs bwd  55#  x5           TKE  BTB  x30 CC  x20          Leg Press  205#  x20  135# 3x10         SL Leg Press  105#  x10 115#  x20 ea                                    Ther Activity                                       Gait Training                                       Modalities

## 2023-09-11 ENCOUNTER — OFFICE VISIT (OUTPATIENT)
Dept: PHYSICAL THERAPY | Facility: CLINIC | Age: 28
End: 2023-09-11
Payer: COMMERCIAL

## 2023-09-11 DIAGNOSIS — M22.2X2 PATELLOFEMORAL SYNDROME, LEFT: ICD-10-CM

## 2023-09-11 DIAGNOSIS — M22.2X1 RIGHT PATELLOFEMORAL SYNDROME: Primary | ICD-10-CM

## 2023-09-11 PROCEDURE — 97112 NEUROMUSCULAR REEDUCATION: CPT

## 2023-09-11 PROCEDURE — 97110 THERAPEUTIC EXERCISES: CPT

## 2023-09-11 NOTE — PROGRESS NOTES
Daily Note     Today's date: 2023  Patient name: Jazmín Aguilera  : 1995  MRN: [id number]  Referring provider: Lore Carbajal DO  Dx:   Encounter Diagnosis     ICD-10-CM    1. Right patellofemoral syndrome  M22.2X1       2. Patellofemoral syndrome, left  M22.2X2                      Subjective: PT reports continued improvement, just feels weak in his quads and hips. Objective: See treatment diary below      Assessment: Tolerated treatment well. Continued to focus more on the strengthening aspect of program and he remains consistent with stretching at home. Still shows weakness in L Leg over the R leg with standing TE's Patient demonstrated fatigue post treatment, exhibited good technique with therapeutic exercises and would benefit from continued PT      Plan: Continue per plan of care. Progress treatment as tolerated. recautions: bilat patellofemoral synd      HEP:  Access Code: [email or web address]  URL: [email or web address]  Date: 2023  Prepared by: Nicole Monique    Exercises  - Seated Knee Extension with Resistance  - 20 reps  - Hip Abduction with Resistance Loop  - 20 reps  - Hip Extension with Resistance Loop  - 20 reps  - Standing Hip Flexion with Resistance Loop  - 20 reps    Manuals        RE     NYA        FOTO     NYA        Quad/hip flexor stretching  WE WE          Hip IR stretching  WE WE          Quad roller NYA WE WE NYA         Posterior chain roller NYA WE WE          IASTM b/l patellar tendons                          Neuro Re-Ed             SLS  Foam  20"x3 Bosu  Round up  20"x3 ea          Foam walk  Beam  3 laps           bosu taps  x20 ea x20 ea          bosu balance  30"x3 30"x3          SL star balance   x10 ea          Bosu squats   5"x10                       Ther Ex             Elliptical    6'         TM fwd walk  3 min  Incline  15% 3 min  Incline  15%  5' 5'       TM bwd walk  Self propel 3 min Self propel 3 min Supine piri stretch 30"x2 ea   30"x2 ea         90/90 active HS stretch x30 ea manual   x20 ea x20 ea       Prone quad stretch w strap 30"x2 manual  30"x2 ea         Hip flexor stretch  manual           Couch stretch    30"x3 ea 30"x2 ea 30"x2 ea       spiderman stretch    x5 ea x5 ea x5 ea       SAQ     Blue bolst 10# 5"x20 ea Blue bolst 10# 5"x20 ea       LAQ     10# x20 ea 10# x20 ea       squat    x20 x20 x20       3way hip     otb loop x20 ea gtb  x20 ea       Fwd, lat lunge     nv x10 ea       CC walk outs lat  55#  x5 ea           CC walk outs fwd  55#  x5           CC walk outs bwd  55#  x5           TKE  BTB  x30 CC  x20          Leg Press  205#  x20  135# 3x10         SL Leg Press  105#  x10 115#  x20 ea                                    Ther Activity                                       Gait Training                                       Modalities

## 2023-09-18 ENCOUNTER — APPOINTMENT (OUTPATIENT)
Dept: PHYSICAL THERAPY | Facility: CLINIC | Age: 28
End: 2023-09-18
Payer: COMMERCIAL

## 2023-09-25 ENCOUNTER — OFFICE VISIT (OUTPATIENT)
Dept: PHYSICAL THERAPY | Facility: CLINIC | Age: 28
End: 2023-09-25
Payer: COMMERCIAL

## 2023-09-25 DIAGNOSIS — M22.2X1 RIGHT PATELLOFEMORAL SYNDROME: Primary | ICD-10-CM

## 2023-09-25 DIAGNOSIS — M22.2X2 PATELLOFEMORAL SYNDROME, LEFT: ICD-10-CM

## 2023-09-25 PROCEDURE — 97110 THERAPEUTIC EXERCISES: CPT

## 2024-02-07 ENCOUNTER — OFFICE VISIT (OUTPATIENT)
Dept: OBGYN CLINIC | Facility: CLINIC | Age: 29
End: 2024-02-07
Payer: COMMERCIAL

## 2024-02-07 VITALS
SYSTOLIC BLOOD PRESSURE: 122 MMHG | BODY MASS INDEX: 23.19 KG/M2 | DIASTOLIC BLOOD PRESSURE: 78 MMHG | HEIGHT: 73 IN | WEIGHT: 175 LBS

## 2024-02-07 DIAGNOSIS — M54.41 ACUTE RIGHT-SIDED LOW BACK PAIN WITH RIGHT-SIDED SCIATICA: Primary | ICD-10-CM

## 2024-02-07 PROCEDURE — 99213 OFFICE O/P EST LOW 20 MIN: CPT | Performed by: FAMILY MEDICINE

## 2024-02-07 RX ORDER — CYCLOBENZAPRINE HCL 10 MG
TABLET ORAL
COMMUNITY
Start: 2024-01-24

## 2024-02-07 RX ORDER — IBUPROFEN 800 MG/1
800 TABLET ORAL EVERY 8 HOURS PRN
Qty: 40 TABLET | Refills: 1 | Status: SHIPPED | OUTPATIENT
Start: 2024-02-07

## 2024-02-07 RX ORDER — MELOXICAM 15 MG/1
15 TABLET ORAL DAILY
COMMUNITY
Start: 2023-09-12 | End: 2024-09-06

## 2024-02-07 NOTE — PATIENT INSTRUCTIONS
Back pain can often be caused by a spectrum of issues ranging from back strain which is a muscle tear in the back and heals with time to spasm associated with chronic back arthritis (degenerative disc disease) to Sciatica which is radiation of pain down the leg with or without numbness and tingling that is caused by a pinch of a nerve in the spine from bony arthritis or herniated disc or pinching of a nerve due to buttock muscle spasm known as piriformis syndrome.     Mainstay of treatment is physical therapy coupled with pharmacologic management of pain. Bed rest is no longer recommended as it results in stiffness and delayed recovery. Physical therapy to toleration at first is recommended and proceeded by progression to strengthening.     Drugs often used to treat back pain are anti-inflammatories (NSAIDs), Tylenol (acetaminophen), muscle relaxers. In recent years, study have shown that Tylenol does not offer much relief however it is used along with nsaids for increased relief especially when patients cannot take other medicines such as muscle relaxers.  Occassionally, for refractory pain, steroid packs such as Medrol are used but studies show that these medications do not offer long term relief. Opiods/pain killers (Percocet, oxycodone, hydrocodone) carry a significant addictive and dependence risk and as such are used only in cases of severe pain and per the CDC limited to a few days of use to avoid dependence.    Mris are reserved for patients with red flags including fevers, chills, unintentional weight loss, dropped foot or severe weakness, bowel or bladder incontinence which are signs of medical emergencies including cancer, infection, severely pinched nerve, and a medical complication known as cauda equina syndrome. If you ever have any of these symptoms then please notify physician immediately and present to the ER.  If you continue to have symptoms then we will pursue MRI of your back after failing 4  weeks of physical therapy. Unless there are red flags as above, most insurances do not cover MRIs to be performed until after a trial of physical therapy. This is because most back pain resolves within 1-3 months and rarely requires invasive intervention.     If back pain fails to improve with conservative measures (therapy, medicine, time) then I will order MRI and if appropriate refer to pain management to consider injections and other invasive intervention. Surgery is rarely warranted early on in back pain unless there is a significant red flag.     Educated risks of mixing NSAIDS ( (non-steroidal anti-inflammatory pills including advil, ibuprofen, motrin, meloxicam, celecoxib, aleve, naproxen, and aspirin containing products) with each other or with steroids (such as prednisone, medrol). Explained risks of mixing these medications including stomach ulcer, severe internal bleeding, and kidney failure. Instructed not to take NSAIDS if have history of stomach ulcers, kidney issues, or uncontrolled hypertension. Instructed patient to use only one brand as prescribed. For naproxen, a maximum of 500 mg per dose every 12 hours and no more than two doses or 1,000mg per day. For Ibuprofen, a maximum of 800 mg per dose every 6 hours but no more than 3 doses or 2,400 mg per day. Never take these medications together. Never take these medications the same day. For severe pain and only if you have no liver problems, you may add Tylenol (also known as acetaminophen) maximum of 1,000  Mg per dose every 6 hours but no more 3 doses or 3,000 mg per day. Patient expressed understanding and agreed to plan.

## 2024-02-07 NOTE — PROGRESS NOTES
"1. Acute right-sided low back pain with right-sided sciatica  MRI lumbar spine wo contrast    [facility]    ibuprofen (MOTRIN) 800 mg tablet        Orders Placed This Encounter   Procedures    MRI lumbar spine wo contrast    [facility]        IMAGING STUDIES: (I personally reviewed images in PACS and report):        PAST REPORTS:        ASSESSMENT/PLAN:  Right LLL Sciatica    [facility] evaluation 10/2/23: \"no evidence to support diagnoses of systemic autoimmune condition; false postive SUSAN\"      Repeat X-ray next visit: None    Return for Follow-up after MRI is completed for review.    Patient instructions below verbally summarized in person during encounter:  Patient Instructions   Back pain can often be caused by a spectrum of issues ranging from back strain which is a muscle tear in the back and heals with time to spasm associated with chronic back arthritis (degenerative disc disease) to Sciatica which is radiation of pain down the leg with or without numbness and tingling that is caused by a pinch of a nerve in the spine from bony arthritis or herniated disc or pinching of a nerve due to buttock muscle spasm known as piriformis syndrome.     Mainstay of treatment is physical therapy coupled with pharmacologic management of pain. Bed rest is no longer recommended as it results in stiffness and delayed recovery. Physical therapy to toleration at first is recommended and proceeded by progression to strengthening.     Drugs often used to treat back pain are anti-inflammatories (NSAIDs), Tylenol (acetaminophen), muscle relaxers. In recent years, study have shown that Tylenol does not offer much relief however it is used along with nsaids for increased relief especially when patients cannot take other medicines such as muscle relaxers.  Occassionally, for refractory pain, steroid packs such as Medrol are used but studies show that these medications do not offer long term relief. " Opiods/pain killers (Percocet, oxycodone, hydrocodone) carry a significant addictive and dependence risk and as such are used only in cases of severe pain and per the CDC limited to a few days of use to avoid dependence.    Mris are reserved for patients with red flags including fevers, chills, unintentional weight loss, dropped foot or severe weakness, bowel or bladder incontinence which are signs of medical emergencies including cancer, infection, severely pinched nerve, and a medical complication known as cauda equina syndrome. If you ever have any of these symptoms then please notify physician immediately and present to the ER.  If you continue to have symptoms then we will pursue MRI of your back after failing 4 weeks of physical therapy. Unless there are red flags as above, most insurances do not cover MRIs to be performed until after a trial of physical therapy. This is because most back pain resolves within 1-3 months and rarely requires invasive intervention.     If back pain fails to improve with conservative measures (therapy, medicine, time) then I will order MRI and if appropriate refer to pain management to consider injections and other invasive intervention. Surgery is rarely warranted early on in back pain unless there is a significant red flag.     Educated risks of mixing NSAIDS ( (non-steroidal anti-inflammatory pills including advil, ibuprofen, motrin, meloxicam, celecoxib, aleve, naproxen, and aspirin containing products) with each other or with steroids (such as prednisone, medrol). Explained risks of mixing these medications including stomach ulcer, severe internal bleeding, and kidney failure. Instructed not to take NSAIDS if have history of stomach ulcers, kidney issues, or uncontrolled hypertension. Instructed patient to use only one brand as prescribed. For naproxen, a maximum of 500 mg per dose every 12 hours and no more than two doses or 1,000mg per day. For Ibuprofen, a maximum of 800  "mg per dose every 6 hours but no more than 3 doses or 2,400 mg per day. Never take these medications together. Never take these medications the same day. For severe pain and only if you have no liver problems, you may add Tylenol (also known as acetaminophen) maximum of 1,000  Mg per dose every 6 hours but no more 3 doses or 3,000 mg per day. Patient expressed understanding and agreed to plan.              __________________________________________________________________________    HISTORY OF PRESENT ILLNESS:    Patient here for evaluation of low back lumbar pain.no injury. 5-6 weeks. . Right lower back. Given steroid by his PCP.       Pain Scale: 8/10, 6/10 today  Radiation Down Leg: right to the lateral calf  LE Parasthesias: yes mild toes    Physical Therapy: has been performing stretching exercises for back as instructed by PCP    Denies any saddle anesthesia, new onset bowel/bladder incontinence, fevers, personal history of cancer, unintentional weight loss, weakness          Review of Systems      Following history reviewed and update:    No past medical history on file.  No past surgical history on file.  Social History   Social History     Substance and Sexual Activity   Alcohol Use Yes     Social History     Substance and Sexual Activity   Drug Use Yes    Types: Marijuana     Social History     Tobacco Use   Smoking Status Never    Passive exposure: Never   Smokeless Tobacco Never     No family history on file.  No Known Allergies       Physical Exam  /78 (BP Location: Left arm, Patient Position: Sitting, Cuff Size: Standard)   Ht 6' 1\" (1.854 m)   Wt 79.4 kg (175 lb)   BMI 23.09 kg/m²         Ortho Exam    BACK EXAM:  Gait: normal    BACK TENDERNESS:  Spinous Processes: no  Paraspinal Muscles: +RLL    DERMATOMAL SENSATION:  L1: normal   L2: normal   L3: normal   L4: normal   L5: normal   S1: normal    STRENGTH (bilateral):  Knee Extension: 5/5  Foot Dorsiflexion: 5/5  Great Toe Extension: " 5/5  Foot Plantarflexion: 5/5  Hip Flexion: 5/5  Hip Abduction: 5/5    BACK:   SUPINE STRAIGHT LEG: negative  PRONE STRAIGHT LEG:  SLUMP:     RIGHT HIP:  LOG ROLL: negative  JOHN: negative  FADIR: negative    LEFT HIP:  LOG ROLL: negative  JOHN: negative  FADIR: negative    __________________________________________________________________________  Procedures

## 2024-02-25 ENCOUNTER — HOSPITAL ENCOUNTER (OUTPATIENT)
Dept: MRI IMAGING | Facility: HOSPITAL | Age: 29
Discharge: HOME | End: 2024-02-25
Payer: COMMERCIAL

## 2024-02-25 DIAGNOSIS — M54.41 ACUTE RIGHT-SIDED LOW BACK PAIN WITH RIGHT-SIDED SCIATICA: ICD-10-CM

## 2024-02-25 PROCEDURE — G1004 CDSM NDSC: HCPCS

## 2024-02-25 PROCEDURE — 72148 MRI LUMBAR SPINE W/O DYE: CPT

## 2024-03-18 ENCOUNTER — TELEPHONE (OUTPATIENT)
Age: 29
End: 2024-03-18

## 2024-03-18 ENCOUNTER — OFFICE VISIT (OUTPATIENT)
Dept: OBGYN CLINIC | Facility: CLINIC | Age: 29
End: 2024-03-18
Payer: COMMERCIAL

## 2024-03-18 VITALS
BODY MASS INDEX: 23.19 KG/M2 | HEIGHT: 73 IN | DIASTOLIC BLOOD PRESSURE: 80 MMHG | SYSTOLIC BLOOD PRESSURE: 132 MMHG | WEIGHT: 175 LBS

## 2024-03-18 DIAGNOSIS — M54.50 ACUTE LOW BACK PAIN, UNSPECIFIED BACK PAIN LATERALITY, UNSPECIFIED WHETHER SCIATICA PRESENT: Primary | ICD-10-CM

## 2024-03-18 PROCEDURE — 99213 OFFICE O/P EST LOW 20 MIN: CPT | Performed by: FAMILY MEDICINE

## 2024-03-18 NOTE — TELEPHONE ENCOUNTER
Caller: Patient    Doctor: Chester    Reason for call:     Patient is checking on his appointment today, he is not sure if his coverage is active today, tried to call the office and no answer.   Should the patient reschedule his appointment or will he be a self pay??  Please call to advise patient.    Call back#: [phone]

## 2024-03-18 NOTE — PROGRESS NOTES
1. Acute low back pain, unspecified back pain laterality, unspecified whether sciatica present   Physical Therapy        Orders Placed This Encounter   Procedures     Physical Therapy        IMAGING STUDIES: (I personally reviewed images in PACS and report):   L1-L2:  Normal.     L2-L3:  Normal.     L3-L4: There is mild loss of disc height and signal. There is a small central disc protrusion. Minimal central canal narrowing. Neural foramina patent bilaterally.     L4-L5: Small focus of high signal in the posterior annulus indicative of a high intensity zone. There is a diffuse disk bulge.  No significant central canal or neural foraminal narrowing.     L5-S1: Mild loss of disc height. Small central disc protrusion. Small focus of high signal in the posterior annulus indicative of a high intensity zone. Small central disc protrusion. No significant central canal or neural foraminal narrowing.     OTHER FINDINGS:  None.     IMPRESSION:     Mild noncompressive lumbar degenerative change.      PAST REPORTS:        ASSESSMENT/PLAN:  Back Pain  Declines referral for lumbar CSI    Repeat X-ray next visit: None    Return if symptoms worsen or fail to improve.    Patient instructions below verbally summarized in person during encounter:  There are no Patient Instructions on file for this visit.      __________________________________________________________________________    HISTORY OF PRESENT ILLNESS:    F/u low back pain LLL 50% overall. Rare pain radiating down the leg.       Review of Systems      Following history reviewed and update:    No past medical history on file.  No past surgical history on file.  Social History   Social History     Substance and Sexual Activity   Alcohol Use Yes     Social History     Substance and Sexual Activity   Drug Use Yes    Types: Marijuana     Social History     Tobacco Use   Smoking Status Never    Passive exposure: Never   Smokeless Tobacco Never     No family history on  "file.  No Known Allergies       Physical Exam  /80 (BP Location: Left arm, Patient Position: Sitting, Cuff Size: Standard)   Ht 6' 1\" (1.854 m)   Wt 79.4 kg (175 lb)   BMI 23.09 kg/m²         Ortho Exam  BACK EXAM:  Gait: normal    DERMATOMAL SENSATION:  L1: normal   L2: normal   L3: normal   L4: normal   L5: normal   S1: normal    STRENGTH (bilateral):  Knee Extension: 5/5  Foot Dorsiflexion: 5/5  Great Toe Extension: 5/5  Foot Plantarflexion: 5/5  Hip Flexion: 5/5  Hip Abduction: 5/5    _________________________________________________________________________  Procedures                  "